# Patient Record
Sex: FEMALE | Race: WHITE
[De-identification: names, ages, dates, MRNs, and addresses within clinical notes are randomized per-mention and may not be internally consistent; named-entity substitution may affect disease eponyms.]

---

## 2017-10-02 ENCOUNTER — HOSPITAL ENCOUNTER (EMERGENCY)
Dept: HOSPITAL 83 - ED | Age: 52
Discharge: HOME | End: 2017-10-02
Payer: COMMERCIAL

## 2017-10-02 VITALS — BODY MASS INDEX: 36.73 KG/M2 | WEIGHT: 234 LBS | HEIGHT: 66.97 IN

## 2017-10-02 DIAGNOSIS — Z79.899: ICD-10-CM

## 2017-10-02 DIAGNOSIS — Z88.0: ICD-10-CM

## 2017-10-02 DIAGNOSIS — Z88.6: ICD-10-CM

## 2017-10-02 DIAGNOSIS — R51: ICD-10-CM

## 2017-10-02 DIAGNOSIS — L03.031: Primary | ICD-10-CM

## 2017-10-02 DIAGNOSIS — Z88.8: ICD-10-CM

## 2017-10-02 DIAGNOSIS — F17.200: ICD-10-CM

## 2017-10-02 DIAGNOSIS — Z91.012: ICD-10-CM

## 2017-10-02 LAB
ALBUMIN SERPL-MCNC: 3.8 GM/DL (ref 3.1–4.5)
ALP SERPL-CCNC: 156 U/L (ref 45–117)
ALT SERPL W P-5'-P-CCNC: 63 U/L (ref 12–78)
AST SERPL-CCNC: 17 IU/L (ref 3–35)
BASOPHILS # BLD AUTO: 0.1 10*3/UL (ref 0–0.1)
BASOPHILS NFR BLD AUTO: 0.6 % (ref 0–1)
BUN SERPL-MCNC: 11 MG/DL (ref 7–24)
CHLORIDE SERPL-SCNC: 104 MMOL/L (ref 98–107)
CREAT SERPL-MCNC: 0.64 MG/DL (ref 0.55–1.02)
EOSINOPHIL # BLD AUTO: 0 % (ref 1–4)
EOSINOPHIL # BLD AUTO: 0 10*3/UL (ref 0–0.4)
ERYTHROCYTE [DISTWIDTH] IN BLOOD BY AUTOMATED COUNT: 14 % (ref 0–14.5)
HCT VFR BLD AUTO: 45.8 % (ref 37–47)
HGB BLD-MCNC: 14.9 G/DL (ref 12–16)
LYMPHOCYTES # BLD AUTO: 4.9 10*3/UL (ref 1.3–4.4)
LYMPHOCYTES NFR BLD AUTO: 32 % (ref 27–41)
MCH RBC QN AUTO: 27.4 PG (ref 27–31)
MCHC RBC AUTO-ENTMCNC: 32.5 G/DL (ref 33–37)
MCV RBC AUTO: 84.2 FL (ref 81–99)
MONOCYTES # BLD AUTO: 1 10*3/UL (ref 0.1–1)
MONOCYTES NFR BLD MANUAL: 6.4 % (ref 3–9)
NEUT #: 9.3 10*3/UL (ref 2.3–7.9)
NEUT %: 60.7 % (ref 47–73)
NRBC BLD QL AUTO: 0 10*3/UL (ref 0–0)
PLATELET # BLD AUTO: 277 10*3/UL (ref 130–400)
PMV BLD AUTO: 11 FL (ref 9.6–12.3)
POTASSIUM SERPL-SCNC: 3.9 MMOL/L (ref 3.5–5.1)
PROT SERPL-MCNC: 8.6 GM/DL (ref 6.4–8.2)
RBC # BLD AUTO: 5.44 10*6/UL (ref 4.1–5.1)
SODIUM SERPL-SCNC: 139 MMOL/L (ref 136–145)
WBC NRBC COR # BLD AUTO: 15.3 10*3/UL (ref 4.8–10.8)

## 2017-10-18 ENCOUNTER — HOSPITAL ENCOUNTER (OUTPATIENT)
Dept: HOSPITAL 83 - CT | Age: 52
Discharge: HOME | End: 2017-10-18
Attending: FAMILY MEDICINE
Payer: COMMERCIAL

## 2017-10-18 DIAGNOSIS — K21.9: ICD-10-CM

## 2017-10-18 DIAGNOSIS — R91.1: Primary | ICD-10-CM

## 2018-03-02 ENCOUNTER — HOSPITAL ENCOUNTER (EMERGENCY)
Dept: HOSPITAL 83 - ED | Age: 53
Discharge: HOME | End: 2018-03-02
Payer: COMMERCIAL

## 2018-03-02 VITALS — WEIGHT: 217 LBS | HEIGHT: 66.97 IN | BODY MASS INDEX: 34.06 KG/M2

## 2018-03-02 DIAGNOSIS — Z91.012: ICD-10-CM

## 2018-03-02 DIAGNOSIS — Z90.710: ICD-10-CM

## 2018-03-02 DIAGNOSIS — Z88.5: ICD-10-CM

## 2018-03-02 DIAGNOSIS — F10.10: ICD-10-CM

## 2018-03-02 DIAGNOSIS — Z90.49: ICD-10-CM

## 2018-03-02 DIAGNOSIS — Z88.8: ICD-10-CM

## 2018-03-02 DIAGNOSIS — Z88.6: ICD-10-CM

## 2018-03-02 DIAGNOSIS — M77.32: Primary | ICD-10-CM

## 2018-03-02 DIAGNOSIS — K21.9: ICD-10-CM

## 2018-03-02 DIAGNOSIS — Z88.0: ICD-10-CM

## 2018-03-02 DIAGNOSIS — J45.909: ICD-10-CM

## 2018-03-02 DIAGNOSIS — I10: ICD-10-CM

## 2018-03-02 DIAGNOSIS — Z79.899: ICD-10-CM

## 2018-06-16 ENCOUNTER — HOSPITAL ENCOUNTER (EMERGENCY)
Dept: HOSPITAL 83 - ED | Age: 53
Discharge: HOME | End: 2018-06-16
Payer: COMMERCIAL

## 2018-06-16 VITALS — HEIGHT: 66.97 IN | WEIGHT: 220 LBS | BODY MASS INDEX: 34.53 KG/M2

## 2018-06-16 DIAGNOSIS — Z90.710: ICD-10-CM

## 2018-06-16 DIAGNOSIS — Z88.0: ICD-10-CM

## 2018-06-16 DIAGNOSIS — Z88.8: ICD-10-CM

## 2018-06-16 DIAGNOSIS — Z91.012: ICD-10-CM

## 2018-06-16 DIAGNOSIS — R11.0: ICD-10-CM

## 2018-06-16 DIAGNOSIS — M54.9: ICD-10-CM

## 2018-06-16 DIAGNOSIS — Z90.49: ICD-10-CM

## 2018-06-16 DIAGNOSIS — J45.909: ICD-10-CM

## 2018-06-16 DIAGNOSIS — F17.200: ICD-10-CM

## 2018-06-16 DIAGNOSIS — K21.9: ICD-10-CM

## 2018-06-16 DIAGNOSIS — K62.89: ICD-10-CM

## 2018-06-16 DIAGNOSIS — I10: ICD-10-CM

## 2018-06-16 DIAGNOSIS — R10.30: Primary | ICD-10-CM

## 2018-06-16 DIAGNOSIS — Z88.5: ICD-10-CM

## 2018-06-16 LAB
ALBUMIN SERPL-MCNC: 3.9 GM/DL (ref 3.1–4.5)
ALP SERPL-CCNC: 149 U/L (ref 45–117)
ALT SERPL W P-5'-P-CCNC: 51 U/L (ref 12–78)
APPEARANCE UR: (no result)
AST SERPL-CCNC: 27 IU/L (ref 3–35)
BACTERIA #/AREA URNS HPF: (no result) /[HPF]
BASOPHILS # BLD AUTO: 0.1 10*3/UL (ref 0–0.1)
BASOPHILS NFR BLD AUTO: 0.7 % (ref 0–1)
BILIRUB UR QL STRIP: NEGATIVE
BUN SERPL-MCNC: 9 MG/DL (ref 7–24)
CHLORIDE SERPL-SCNC: 109 MMOL/L (ref 98–107)
COLOR UR: YELLOW
CREAT SERPL-MCNC: 0.74 MG/DL (ref 0.55–1.02)
EOSINOPHIL # BLD AUTO: 0 % (ref 1–4)
EOSINOPHIL # BLD AUTO: 0 10*3/UL (ref 0–0.4)
EPI CELLS #/AREA URNS HPF: (no result) /[HPF]
ERYTHROCYTE [DISTWIDTH] IN BLOOD BY AUTOMATED COUNT: 13.9 % (ref 0–14.5)
GLUCOSE UR QL: NEGATIVE
HCT VFR BLD AUTO: 47.4 % (ref 37–47)
HGB BLD-MCNC: 15.4 G/DL (ref 12–16)
HGB UR QL STRIP: NEGATIVE
KETONES UR QL STRIP: NEGATIVE
LEUKOCYTE ESTERASE UR QL STRIP: NEGATIVE
LYMPHOCYTES # BLD AUTO: 3.8 10*3/UL (ref 1.3–4.4)
LYMPHOCYTES NFR BLD AUTO: 28.5 % (ref 27–41)
MCH RBC QN AUTO: 27.7 PG (ref 27–31)
MCHC RBC AUTO-ENTMCNC: 32.5 G/DL (ref 33–37)
MCV RBC AUTO: 85.4 FL (ref 81–99)
MONOCYTES # BLD AUTO: 0.8 10*3/UL (ref 0.1–1)
MONOCYTES NFR BLD MANUAL: 6.4 % (ref 3–9)
NEUT #: 8.5 10*3/UL (ref 2.3–7.9)
NEUT %: 63.9 % (ref 47–73)
NITRITE UR QL STRIP: NEGATIVE
NRBC BLD QL AUTO: 0 % (ref 0–0)
PH UR STRIP: 5.5 [PH] (ref 5–9)
PLATELET # BLD AUTO: 266 10*3/UL (ref 130–400)
PMV BLD AUTO: 11 FL (ref 9.6–12.3)
POTASSIUM SERPL-SCNC: 4.3 MMOL/L (ref 3.5–5.1)
PROT SERPL-MCNC: 7.9 GM/DL (ref 6.4–8.2)
RBC # BLD AUTO: 5.55 10*6/UL (ref 4.1–5.1)
RBC #/AREA URNS HPF: (no result) RBC/HPF (ref 0–2)
SODIUM SERPL-SCNC: 142 MMOL/L (ref 136–145)
SP GR UR: 1.01 (ref 1–1.03)
UROBILINOGEN UR STRIP-MCNC: 0.2 E.U./DL (ref 0.2–1)
WBC NRBC COR # BLD AUTO: 13.2 10*3/UL (ref 4.8–10.8)

## 2019-11-07 ENCOUNTER — HOSPITAL ENCOUNTER (OUTPATIENT)
Dept: HOSPITAL 83 - LAB | Age: 54
Discharge: HOME | End: 2019-11-07
Attending: DERMATOLOGY
Payer: COMMERCIAL

## 2019-11-07 DIAGNOSIS — L73.2: Primary | ICD-10-CM

## 2019-11-07 LAB
ALBUMIN SERPL-MCNC: 3.9 GM/DL (ref 3.1–4.5)
ALBUMIN: 3.9 GM/DL (ref 3.1–4.5)
ALP BLD-CCNC: 121 U/L (ref 45–117)
ALP SERPL-CCNC: 121 U/L (ref 45–117)
ALT SERPL W P-5'-P-CCNC: 52 U/L (ref 12–78)
ALT SERPL-CCNC: 52 U/L (ref 12–78)
AST SERPL-CCNC: 23 IU/L (ref 3–35)
AST SERPL-CCNC: 23 IU/L (ref 3–35)
BILIRUB SERPL-MCNC: 0.3 MG/DL (ref 0.2–1)
BUN BLDV-MCNC: 10 MG/DL (ref 7–24)
BUN SERPL-MCNC: 10 MG/DL (ref 7–24)
CALCIUM SERPL-MCNC: 9.4 MG/DL (ref 8.5–10.5)
CHLORIDE BLD-SCNC: 104 MMOL/L (ref 98–107)
CHLORIDE SERPL-SCNC: 104 MMOL/L (ref 98–107)
CO2: 27 MMOL/L (ref 21–32)
CREAT SERPL-MCNC: 0.9 MG/DL (ref 0.55–1.02)
CREAT SERPL-MCNC: 0.9 MG/DL (ref 0.55–1.02)
ERYTHROCYTE [DISTWIDTH] IN BLOOD BY AUTOMATED COUNT: 13.6 % (ref 0–14.5)
GFR AFRICAN AMERICAN: > 60 ML/MIN
GFR SERPL CREATININE-BSD FRML MDRD: >60 ML/MIN/
GLUCOSE: 175 MG/DL (ref 65–99)
HCT VFR BLD AUTO: 45.2 % (ref 37–47)
HCT VFR BLD CALC: 45.2 % (ref 37–47)
HEMOGLOBIN: 14.4 G/DL (ref 12–16)
HGB BLD-MCNC: 14.4 G/DL (ref 12–16)
MCH RBC QN AUTO: 28.4 PG (ref 27–31)
MCH RBC QN AUTO: 28.4 PG (ref 27–31)
MCHC RBC AUTO-ENTMCNC: 31.9 G/DL (ref 33–37)
MCHC RBC AUTO-ENTMCNC: 31.9 G/DL (ref 33–37)
MCV RBC AUTO: 89.2 FL (ref 81–99)
MCV RBC AUTO: 89.2 FL (ref 81–99)
NRBC BLD QL AUTO: 0 % (ref 0–0)
NUCLEATED RED BLOOD CELLS: 0 % (ref 0–0)
PDW BLD-RTO: 13.6 % (ref 0–14.5)
PLATELET # BLD AUTO: 296 10*3/UL (ref 130–400)
PLATELET # BLD: 296 10*3/UL (ref 130–400)
PLATELET SUFFICIENCY: NORMAL
PMV BLD AUTO: 11.7 FL (ref 9.6–12.3)
PMV BLD AUTO: 11.7 FL (ref 9.6–12.3)
POTASSIUM SERPL-SCNC: 3.9 MMOL/L (ref 3.5–5.1)
POTASSIUM SERPL-SCNC: 3.9 MMOL/L (ref 3.5–5.1)
PROT SERPL-MCNC: 8.5 GM/DL (ref 6.4–8.2)
RBC # BLD AUTO: 5.07 10*6/UL (ref 4.1–5.1)
RBC # BLD: 5.07 10*6/UL (ref 4.1–5.1)
RBC MORPH BLD: NORMAL
SODIUM BLD-SCNC: 139 MMOL/L (ref 136–145)
SODIUM SERPL-SCNC: 139 MMOL/L (ref 136–145)
TOTAL CELLS COUNTED: 100 #CELLS
TOTAL PROTEIN: 8.5 GM/DL (ref 6.4–8.2)
VARIANT LYMPHS NFR BLD MANUAL: 5 % (ref 0–0)
WBC # BLD: 12.1 10*3/UL (ref 4.8–10.8)
WBC NRBC COR # BLD AUTO: 12.1 10*3/UL (ref 4.8–10.8)

## 2019-11-10 LAB
HBV SURFACE AB TITR SER: NON REACTIVE {TITER}
HEPATITIS B SURFACE ANTIGEN: NEGATIVE
HEPATITIS C ANTIBODY: <0.1 (ref 0–0.9)
TB1 AG VALUE: 0.03 IU/ML

## 2020-01-10 ENCOUNTER — HOSPITAL ENCOUNTER (EMERGENCY)
Dept: HOSPITAL 83 - ED | Age: 55
Discharge: HOME | End: 2020-01-10
Payer: COMMERCIAL

## 2020-01-10 VITALS — WEIGHT: 213 LBS | HEIGHT: 66.97 IN | BODY MASS INDEX: 33.43 KG/M2

## 2020-01-10 DIAGNOSIS — N13.2: Primary | ICD-10-CM

## 2020-01-10 DIAGNOSIS — Z88.6: ICD-10-CM

## 2020-01-10 DIAGNOSIS — Z87.442: ICD-10-CM

## 2020-01-10 DIAGNOSIS — K21.9: ICD-10-CM

## 2020-01-10 DIAGNOSIS — R91.1: ICD-10-CM

## 2020-01-10 DIAGNOSIS — Z91.010: ICD-10-CM

## 2020-01-10 DIAGNOSIS — J45.909: ICD-10-CM

## 2020-01-10 DIAGNOSIS — Z90.49: ICD-10-CM

## 2020-01-10 DIAGNOSIS — R11.10: ICD-10-CM

## 2020-01-10 DIAGNOSIS — Z88.0: ICD-10-CM

## 2020-01-10 DIAGNOSIS — E11.9: ICD-10-CM

## 2020-01-10 DIAGNOSIS — Z79.899: ICD-10-CM

## 2020-01-10 DIAGNOSIS — I10: ICD-10-CM

## 2020-01-10 DIAGNOSIS — Z88.8: ICD-10-CM

## 2020-01-10 DIAGNOSIS — F17.200: ICD-10-CM

## 2020-01-10 LAB
ALBUMIN SERPL-MCNC: 3.8 GM/DL (ref 3.1–4.5)
ALP SERPL-CCNC: 121 U/L (ref 45–117)
ALT SERPL W P-5'-P-CCNC: 64 U/L (ref 12–78)
APPEARANCE UR: (no result)
AST SERPL-CCNC: 25 IU/L (ref 3–35)
BACTERIA #/AREA URNS HPF: (no result) /[HPF]
BASOPHILS # BLD AUTO: 0.1 10*3/UL (ref 0–0.1)
BASOPHILS NFR BLD AUTO: 0.6 % (ref 0–1)
BILIRUB UR QL STRIP: NEGATIVE
BUN SERPL-MCNC: 14 MG/DL (ref 7–24)
CHLORIDE SERPL-SCNC: 111 MMOL/L (ref 98–107)
COLOR UR: YELLOW
CREAT SERPL-MCNC: 0.83 MG/DL (ref 0.55–1.02)
EOSINOPHIL # BLD AUTO: 0 % (ref 1–4)
EOSINOPHIL # BLD AUTO: 0 10*3/UL (ref 0–0.4)
EPI CELLS #/AREA URNS HPF: (no result) /[HPF]
ERYTHROCYTE [DISTWIDTH] IN BLOOD BY AUTOMATED COUNT: 13.9 % (ref 0–14.5)
GLUCOSE UR QL: NEGATIVE
HCT VFR BLD AUTO: 45.8 % (ref 37–47)
HGB BLD-MCNC: 14.3 G/DL (ref 12–16)
HGB UR QL STRIP: (no result)
KETONES UR QL STRIP: NEGATIVE
LEUKOCYTE ESTERASE UR QL STRIP: NEGATIVE
LYMPHOCYTES # BLD AUTO: 2.9 10*3/UL (ref 1.3–4.4)
LYMPHOCYTES NFR BLD AUTO: 18.2 % (ref 27–41)
MCH RBC QN AUTO: 26.8 PG (ref 27–31)
MCHC RBC AUTO-ENTMCNC: 31.2 G/DL (ref 33–37)
MCV RBC AUTO: 85.8 FL (ref 81–99)
MONOCYTES # BLD AUTO: 1 10*3/UL (ref 0.1–1)
MONOCYTES NFR BLD MANUAL: 6.1 % (ref 3–9)
NEUT #: 12 10*3/UL (ref 2.3–7.9)
NEUT %: 74.4 % (ref 47–73)
NITRITE UR QL STRIP: NEGATIVE
NRBC BLD QL AUTO: 0 % (ref 0–0)
PH UR STRIP: 5 [PH] (ref 5–9)
PLATELET # BLD AUTO: 264 10*3/UL (ref 130–400)
PMV BLD AUTO: 11.3 FL (ref 9.6–12.3)
POTASSIUM SERPL-SCNC: 4 MMOL/L (ref 3.5–5.1)
PROT SERPL-MCNC: 7.9 GM/DL (ref 6.4–8.2)
RBC # BLD AUTO: 5.34 10*6/UL (ref 4.1–5.1)
RBC #/AREA URNS HPF: (no result) RBC/HPF (ref 0–2)
SODIUM SERPL-SCNC: 145 MMOL/L (ref 136–145)
SP GR UR: >= 1.03 (ref 1–1.03)
UROBILINOGEN UR STRIP-MCNC: 0.2 E.U./DL (ref 0.2–1)
WBC #/AREA URNS HPF: (no result) WBC/HPF (ref 0–5)
WBC NRBC COR # BLD AUTO: 16.2 10*3/UL (ref 4.8–10.8)

## 2020-01-28 ENCOUNTER — HOSPITAL ENCOUNTER (OUTPATIENT)
Dept: HOSPITAL 83 - LAB | Age: 55
Discharge: HOME | End: 2020-01-28
Attending: UROLOGY
Payer: COMMERCIAL

## 2020-01-28 DIAGNOSIS — I10: Primary | ICD-10-CM

## 2020-01-28 DIAGNOSIS — N20.0: ICD-10-CM

## 2020-01-28 LAB
ALBUMIN SERPL-MCNC: 3.6 GM/DL (ref 3.1–4.5)
ALP SERPL-CCNC: 124 U/L (ref 45–117)
ALT SERPL W P-5'-P-CCNC: 56 U/L (ref 12–78)
APPEARANCE UR: (no result)
AST SERPL-CCNC: 17 IU/L (ref 3–35)
BACTERIA #/AREA URNS HPF: (no result) /[HPF]
BASOPHILS # BLD AUTO: 0.1 10*3/UL (ref 0–0.1)
BASOPHILS NFR BLD AUTO: 0.8 % (ref 0–1)
BILIRUB UR QL STRIP: NEGATIVE
BUN SERPL-MCNC: 9 MG/DL (ref 7–24)
CHLORIDE SERPL-SCNC: 108 MMOL/L (ref 98–107)
COLOR UR: (no result)
CREAT SERPL-MCNC: 0.8 MG/DL (ref 0.55–1.02)
EOSINOPHIL # BLD AUTO: 0 % (ref 1–4)
EOSINOPHIL # BLD AUTO: 0 10*3/UL (ref 0–0.4)
ERYTHROCYTE [DISTWIDTH] IN BLOOD BY AUTOMATED COUNT: 14.2 % (ref 0–14.5)
GLUCOSE UR QL: NEGATIVE
HCT VFR BLD AUTO: 47.1 % (ref 37–47)
HGB BLD-MCNC: 14.8 G/DL (ref 12–16)
HGB UR QL STRIP: NEGATIVE
KETONES UR QL STRIP: (no result)
LEUKOCYTE ESTERASE UR QL STRIP: NEGATIVE
LYMPHOCYTES # BLD AUTO: 3.7 10*3/UL (ref 1.3–4.4)
LYMPHOCYTES NFR BLD AUTO: 33.9 % (ref 27–41)
MCH RBC QN AUTO: 27.2 PG (ref 27–31)
MCHC RBC AUTO-ENTMCNC: 31.4 G/DL (ref 33–37)
MCV RBC AUTO: 86.4 FL (ref 81–99)
MONOCYTES # BLD AUTO: 1 10*3/UL (ref 0.1–1)
MONOCYTES NFR BLD MANUAL: 8.8 % (ref 3–9)
MUCOUS THREADS URNS QL MICRO: (no result)
NEUT #: 6.2 10*3/UL (ref 2.3–7.9)
NEUT %: 56.1 % (ref 47–73)
NITRITE UR QL STRIP: NEGATIVE
NRBC BLD QL AUTO: 0 % (ref 0–0)
PH UR STRIP: 5 [PH] (ref 5–9)
PLATELET # BLD AUTO: 234 10*3/UL (ref 130–400)
PMV BLD AUTO: 11.9 FL (ref 9.6–12.3)
POTASSIUM SERPL-SCNC: 3.8 MMOL/L (ref 3.5–5.1)
PROT SERPL-MCNC: 7.7 GM/DL (ref 6.4–8.2)
RBC # BLD AUTO: 5.45 10*6/UL (ref 4.1–5.1)
RBC #/AREA URNS HPF: (no result) RBC/HPF (ref 0–2)
SODIUM SERPL-SCNC: 141 MMOL/L (ref 136–145)
SP GR UR: 1.03 (ref 1–1.03)
T3 SERPL-MCNC: 42 % (ref 31–39)
T4 SERPL-MCNC: 8.9 UG/DL (ref 4.8–13.9)
UROBILINOGEN UR STRIP-MCNC: 0.2 E.U./DL (ref 0.2–1)
WBC #/AREA URNS HPF: (no result) WBC/HPF (ref 0–5)
WBC NRBC COR # BLD AUTO: 11 10*3/UL (ref 4.8–10.8)

## 2020-01-30 ENCOUNTER — HOSPITAL ENCOUNTER (INPATIENT)
Dept: HOSPITAL 83 - ED | Age: 55
LOS: 4 days | Discharge: LEFT BEFORE DISCHARGE | DRG: 720 | End: 2020-02-03
Attending: INTERNAL MEDICINE | Admitting: INTERNAL MEDICINE
Payer: COMMERCIAL

## 2020-01-30 VITALS — DIASTOLIC BLOOD PRESSURE: 72 MMHG | SYSTOLIC BLOOD PRESSURE: 130 MMHG

## 2020-01-30 VITALS — WEIGHT: 234.31 LBS | HEIGHT: 66.97 IN | BODY MASS INDEX: 36.78 KG/M2

## 2020-01-30 VITALS — SYSTOLIC BLOOD PRESSURE: 130 MMHG | DIASTOLIC BLOOD PRESSURE: 76 MMHG

## 2020-01-30 VITALS — DIASTOLIC BLOOD PRESSURE: 55 MMHG

## 2020-01-30 VITALS — DIASTOLIC BLOOD PRESSURE: 76 MMHG | SYSTOLIC BLOOD PRESSURE: 129 MMHG

## 2020-01-30 VITALS — DIASTOLIC BLOOD PRESSURE: 58 MMHG

## 2020-01-30 DIAGNOSIS — F48.2: ICD-10-CM

## 2020-01-30 DIAGNOSIS — Z53.29: ICD-10-CM

## 2020-01-30 DIAGNOSIS — M54.9: ICD-10-CM

## 2020-01-30 DIAGNOSIS — F17.210: ICD-10-CM

## 2020-01-30 DIAGNOSIS — Z80.1: ICD-10-CM

## 2020-01-30 DIAGNOSIS — Z80.8: ICD-10-CM

## 2020-01-30 DIAGNOSIS — Z90.49: ICD-10-CM

## 2020-01-30 DIAGNOSIS — J96.21: ICD-10-CM

## 2020-01-30 DIAGNOSIS — J18.9: ICD-10-CM

## 2020-01-30 DIAGNOSIS — R65.20: ICD-10-CM

## 2020-01-30 DIAGNOSIS — J45.901: ICD-10-CM

## 2020-01-30 DIAGNOSIS — Z88.0: ICD-10-CM

## 2020-01-30 DIAGNOSIS — Z79.899: ICD-10-CM

## 2020-01-30 DIAGNOSIS — Z82.49: ICD-10-CM

## 2020-01-30 DIAGNOSIS — K21.9: ICD-10-CM

## 2020-01-30 DIAGNOSIS — J20.9: ICD-10-CM

## 2020-01-30 DIAGNOSIS — Z90.710: ICD-10-CM

## 2020-01-30 DIAGNOSIS — Z91.018: ICD-10-CM

## 2020-01-30 DIAGNOSIS — I10: ICD-10-CM

## 2020-01-30 DIAGNOSIS — Z88.6: ICD-10-CM

## 2020-01-30 DIAGNOSIS — Z88.8: ICD-10-CM

## 2020-01-30 DIAGNOSIS — E11.65: ICD-10-CM

## 2020-01-30 DIAGNOSIS — E44.0: ICD-10-CM

## 2020-01-30 DIAGNOSIS — Z71.6: ICD-10-CM

## 2020-01-30 DIAGNOSIS — A41.9: Primary | ICD-10-CM

## 2020-01-30 LAB
ALBUMIN SERPL-MCNC: 3.5 GM/DL (ref 3.1–4.5)
ALP SERPL-CCNC: 107 U/L (ref 45–117)
ALT SERPL W P-5'-P-CCNC: 62 U/L (ref 12–78)
APTT PPP: 26.2 SECONDS (ref 20–32.1)
AST SERPL-CCNC: 20 IU/L (ref 3–35)
BASOPHILS # BLD AUTO: 0.1 10*3/UL (ref 0–0.1)
BASOPHILS NFR BLD AUTO: 0.7 % (ref 0–1)
BUN SERPL-MCNC: 10 MG/DL (ref 7–24)
CHLORIDE SERPL-SCNC: 102 MMOL/L (ref 98–107)
CREAT SERPL-MCNC: 0.71 MG/DL (ref 0.55–1.02)
EOSINOPHIL # BLD AUTO: 0.2 10*3/UL (ref 0–0.4)
EOSINOPHIL # BLD AUTO: 2 % (ref 1–4)
ERYTHROCYTE [DISTWIDTH] IN BLOOD BY AUTOMATED COUNT: 14.4 % (ref 0–14.5)
HCT VFR BLD AUTO: 47.3 % (ref 37–47)
HGB BLD-MCNC: 15.3 G/DL (ref 12–16)
INR BLD: 1 (ref 2–3.5)
LYMPHOCYTES # BLD AUTO: 2.2 10*3/UL (ref 1.3–4.4)
LYMPHOCYTES NFR BLD AUTO: 18 % (ref 27–41)
MCH RBC QN AUTO: 27.5 PG (ref 27–31)
MCHC RBC AUTO-ENTMCNC: 32.3 G/DL (ref 33–37)
MCV RBC AUTO: 85.1 FL (ref 81–99)
MONOCYTES # BLD AUTO: 1.1 10*3/UL (ref 0.1–1)
MONOCYTES NFR BLD MANUAL: 9 % (ref 3–9)
NEUT #: 8.4 10*3/UL (ref 2.3–7.9)
NEUT %: 69.8 % (ref 47–73)
NRBC BLD QL AUTO: 0 % (ref 0–0)
PLATELET # BLD AUTO: 209 10*3/UL (ref 130–400)
PMV BLD AUTO: 11.4 FL (ref 9.6–12.3)
POTASSIUM SERPL-SCNC: 3.9 MMOL/L (ref 3.5–5.1)
PROT SERPL-MCNC: 8 GM/DL (ref 6.4–8.2)
RBC # BLD AUTO: 5.56 10*6/UL (ref 4.1–5.1)
SODIUM SERPL-SCNC: 137 MMOL/L (ref 136–145)
TROPONIN I SERPL-MCNC: < 0.015 NG/ML (ref ?–0.04)
WBC NRBC COR # BLD AUTO: 12.1 10*3/UL (ref 4.8–10.8)

## 2020-01-30 NOTE — NUR
[facility] under the
services of MARVIN Araujo DO with a diagnosis of SEPSIS.
Chief complaint is SOB.
Patient arrived via bed from ER.
Monitor applied. Initial assessment completed.
Vital signs taken and recorded.
MARVIN ARAUJO DO notified of admission to the unit.
Orders received.
See assessment for past medical history, medications
and allergies.
Patient and/or family oriented to [facility]. [facility]
Clothing/patient valuable form completed.
 
TAE BHARDWAJ

## 2020-01-31 VITALS — DIASTOLIC BLOOD PRESSURE: 63 MMHG | SYSTOLIC BLOOD PRESSURE: 140 MMHG

## 2020-01-31 VITALS — DIASTOLIC BLOOD PRESSURE: 55 MMHG

## 2020-01-31 VITALS — DIASTOLIC BLOOD PRESSURE: 62 MMHG

## 2020-01-31 VITALS — DIASTOLIC BLOOD PRESSURE: 56 MMHG

## 2020-01-31 VITALS — DIASTOLIC BLOOD PRESSURE: 58 MMHG | SYSTOLIC BLOOD PRESSURE: 121 MMHG

## 2020-01-31 LAB
ALBUMIN SERPL-MCNC: 3.2 GM/DL (ref 3.1–4.5)
ALP SERPL-CCNC: 81 U/L (ref 45–117)
ALT SERPL W P-5'-P-CCNC: 45 U/L (ref 12–78)
AST SERPL-CCNC: 18 IU/L (ref 3–35)
BASOPHILS # BLD AUTO: 0 10*3/UL (ref 0–0.1)
BASOPHILS NFR BLD AUTO: 0.3 % (ref 0–1)
BUN SERPL-MCNC: 11 MG/DL (ref 7–24)
CHLORIDE SERPL-SCNC: 109 MMOL/L (ref 98–107)
CREAT SERPL-MCNC: 0.62 MG/DL (ref 0.55–1.02)
EOSINOPHIL # BLD AUTO: 0 % (ref 1–4)
EOSINOPHIL # BLD AUTO: 0 10*3/UL (ref 0–0.4)
ERYTHROCYTE [DISTWIDTH] IN BLOOD BY AUTOMATED COUNT: 14.2 % (ref 0–14.5)
HCT VFR BLD AUTO: 41.8 % (ref 37–47)
HGB BLD-MCNC: 13.2 G/DL (ref 12–16)
LYMPHOCYTES # BLD AUTO: 1.3 10*3/UL (ref 1.3–4.4)
LYMPHOCYTES NFR BLD AUTO: 8.4 % (ref 27–41)
MCH RBC QN AUTO: 26.9 PG (ref 27–31)
MCHC RBC AUTO-ENTMCNC: 31.6 G/DL (ref 33–37)
MCV RBC AUTO: 85.3 FL (ref 81–99)
MONOCYTES # BLD AUTO: 1 10*3/UL (ref 0.1–1)
MONOCYTES NFR BLD MANUAL: 6.1 % (ref 3–9)
NEUT #: 13.1 10*3/UL (ref 2.3–7.9)
NEUT %: 83.4 % (ref 47–73)
NRBC BLD QL AUTO: 0 10*3/UL (ref 0–0)
PLATELET # BLD AUTO: 200 10*3/UL (ref 130–400)
PMV BLD AUTO: 11.5 FL (ref 9.6–12.3)
POTASSIUM SERPL-SCNC: 3.9 MMOL/L (ref 3.5–5.1)
PROT SERPL-MCNC: 7.2 GM/DL (ref 6.4–8.2)
RBC # BLD AUTO: 4.9 10*6/UL (ref 4.1–5.1)
SODIUM SERPL-SCNC: 142 MMOL/L (ref 136–145)
WBC NRBC COR # BLD AUTO: 15.7 10*3/UL (ref 4.8–10.8)

## 2020-01-31 NOTE — NUR
ASSESSMENT COMPLETED AND DOCUMENTED. PT C/O OF PAIN BACK AND SHOULDER PAIN
RATES A 9 ON PAIN SCALE. MEDICATED PER MAR. RESTING IN BED WATCHING
TV. [facility] Formerly named [facility]

## 2020-01-31 NOTE — NUR
in to talk to patient.
Patient states lives at home with family.
There are no steps in the home.
Physician: jasmin taylor
Pharmacy: [facility]
Home health services: none
Patient's level of ADLs: INDEPENDENT
Patient has working utilities: all working
DME: none
Follow-up physician's appointment after d/c: will be made by hospitalist nurse
director upon discharge
Does patient want to access PORTAL?: no
Discharge plan discussed with patient, she lives at home with family is
independent in adls and ambulation, she states she will return home when
medically stable and denies any home needs, case management will follow.
 
JONATAN DENNIS

## 2020-01-31 NOTE — NUR
PT VERY COOPERATIVE. NO SIGNS OF SOB RESTING QUIETELY. NO C/O OF PAIN OR
DISCOMFORT. [facility]

## 2020-01-31 NOTE — NUR
NO C/O OF NAUSEA. STATES " I FEEL MUCH BETTER" NO SIGNS OF DISCOMFORT. NO C/O
PAIN. VISITIN [facility] FAMILY. REPORT GIVEN TO APRIL. [facility]

## 2020-02-01 VITALS — DIASTOLIC BLOOD PRESSURE: 80 MMHG

## 2020-02-01 VITALS — SYSTOLIC BLOOD PRESSURE: 125 MMHG | DIASTOLIC BLOOD PRESSURE: 59 MMHG

## 2020-02-01 VITALS — DIASTOLIC BLOOD PRESSURE: 82 MMHG | SYSTOLIC BLOOD PRESSURE: 145 MMHG

## 2020-02-01 VITALS — DIASTOLIC BLOOD PRESSURE: 59 MMHG

## 2020-02-01 VITALS — DIASTOLIC BLOOD PRESSURE: 74 MMHG | SYSTOLIC BLOOD PRESSURE: 132 MMHG

## 2020-02-02 VITALS — DIASTOLIC BLOOD PRESSURE: 68 MMHG

## 2020-02-02 VITALS — DIASTOLIC BLOOD PRESSURE: 73 MMHG

## 2020-02-02 VITALS — DIASTOLIC BLOOD PRESSURE: 71 MMHG

## 2020-02-02 VITALS — DIASTOLIC BLOOD PRESSURE: 75 MMHG

## 2020-02-02 LAB
ALBUMIN SERPL-MCNC: 3 GM/DL (ref 3.1–4.5)
ALP SERPL-CCNC: 76 U/L (ref 45–117)
ALT SERPL W P-5'-P-CCNC: 45 U/L (ref 12–78)
AST SERPL-CCNC: 22 IU/L (ref 3–35)
BUN SERPL-MCNC: 12 MG/DL (ref 7–24)
CHLORIDE SERPL-SCNC: 107 MMOL/L (ref 98–107)
CREAT SERPL-MCNC: 0.6 MG/DL (ref 0.55–1.02)
ERYTHROCYTE [DISTWIDTH] IN BLOOD BY AUTOMATED COUNT: 14.7 % (ref 0–14.5)
HCT VFR BLD AUTO: 40.7 % (ref 37–47)
HGB BLD-MCNC: 12.4 G/DL (ref 12–16)
MCH RBC QN AUTO: 26.4 PG (ref 27–31)
MCHC RBC AUTO-ENTMCNC: 30.5 G/DL (ref 33–37)
MCV RBC AUTO: 86.8 FL (ref 81–99)
NRBC BLD QL AUTO: 0 10*3/UL (ref 0–0)
PLASMA CELLS # BLD MANUAL: 1 % (ref 0–0)
PLATELET # BLD AUTO: 194 10*3/UL (ref 130–400)
PLATELET SUFFICIENCY: NORMAL
PMV BLD AUTO: 11.6 FL (ref 9.6–12.3)
POTASSIUM SERPL-SCNC: 4.4 MMOL/L (ref 3.5–5.1)
PROT SERPL-MCNC: 6.6 GM/DL (ref 6.4–8.2)
RBC # BLD AUTO: 4.69 10*6/UL (ref 4.1–5.1)
SODIUM SERPL-SCNC: 142 MMOL/L (ref 136–145)
TOTAL CELLS COUNTED: 100 #CELLS
VARIANT LYMPHS NFR BLD MANUAL: 1 % (ref 0–0)
WBC NRBC COR # BLD AUTO: 9.2 10*3/UL (ref 4.8–10.8)

## 2020-02-02 NOTE — NUR
HOME O2 ASSESSMENT:  PRE BP:  144/75, HR 75, RR 18, PULSE OX 83% ON ROOM AIR
AT REST.  PLACED 2 L/M ON PATIENT, SAT >92% AT REST.  AMBULATED PATIENT APPROX
12 FT ON 2 L/M, SAT DECREASED TO 88% WHILE AMBULATING.  INCREASED O2 TO 3
L/M-SAT 89% WHILE AMBULATING, >4 L/M-SAT 92% WHILE AMBULATING.  PATIENT
VISIBLY SHORT OF BREATH.
POST BP:  151/78, HR 80, RR 22, PULSE OX 92% ON 2 L/M AT REST.
RN NOTIFIED.

## 2020-02-02 NOTE — NUR
SPOKE WITH DR. SLAUGHTER AT THIS TIME NOTIFIED HIM PATIENT IS REQUESTING COUGH
SYRUP AND HER COUGH SYRUP IS SCHEDULED, HE STATED TO CHANGE IT TO PRN AND SHE
CAN HAVE A DOSE NOW

## 2020-02-02 NOTE — NUR
PATIENT AWAKE, ALERT, & ORIENTED X3. LUNGS DIMINISHED WITH VERY FAINT WHEEZES,
PT WEARING O2 @ 2L PER NC. ABD SOFT, NONTENDER, BSX4, NO EDEMA. PT STAETS
COUGH HAS DECREASED A LOT. DENIES ANY PAIN AT THIS TIME & C/O SOB UPON
EXERTION. CALL LIGHT IS WITHIN REACH.

## 2020-02-03 VITALS — SYSTOLIC BLOOD PRESSURE: 142 MMHG | DIASTOLIC BLOOD PRESSURE: 66 MMHG

## 2020-02-03 VITALS — DIASTOLIC BLOOD PRESSURE: 90 MMHG | SYSTOLIC BLOOD PRESSURE: 130 MMHG

## 2020-02-03 VITALS — DIASTOLIC BLOOD PRESSURE: 77 MMHG | SYSTOLIC BLOOD PRESSURE: 142 MMHG

## 2020-02-03 NOTE — NUR
case management received a script for a nebulizer for patient, called
[facility] medical, spoke to Yaakov, dave and patient's information faxed
to him, informed him that patient is a possible discharge today to home and
nebulzier can be delivered to her home

## 2020-02-03 NOTE — NUR
AMBULATING PULSE OXIMETRY PERFORMED. NASAL CANNULA REMOVED X25 MINS. ON ROOM
AIR AT REST SPO2 = 86% HR=84 BPM. INCREASED O2 TO 2LNC SPO2 = 92% TO START
WALKING. WHILE WALKING PT REQUIRED INCRIMENTAL INCREASES OF O2 UP TO 4LNC TO
INCREASE SPO2 = 94% FU=285 BPM WHILE AMBULATING. PT RETURNED TO ROOM, AT REST
O2 WAS DECREASED TO 2LNC SPO2 = 95% HR =88 BPM.

## 2020-02-03 NOTE — NUR
Patient may be converted to PO levofloxacin if clinically feasible - all
criteria met for IV to PO conversion.
 
Thanks,
Jair Lee, PharmD, [facility]

## 2020-02-03 NOTE — NUR
PT STATED THAT IF HER OXYGEN IS NOT DELIVERED BY 1500 SHE IS STILL LEAVING, PT
EDUCATED EXTENSIVELY ON NEED FOR OXYGEN, PT SITTING ON BED WITHOUT OXYGEN AND
HER PULSE OX WAS ONLY 84% ON RA, PT STATED " JUNIE PUT IT ON RIGHT NOW BUT IM
TELLING YOU RIGHT NOW IM NOT GOING TO WEAR THIS OXYGEN AT HOME, IM NOT GOING
TO BE 54 AND DEPENDENT ON OXYGEN, PT EDUCATED AGAIN ON NEED FOR OXYGEN AT THIS
TIME, ENCOURAGED PT TO PURCHASE A PULSE OX OVER THR COUNTER TO MONITOR OXYGEN
LEVELS AT HOME. AYE MATHIAS NOTIFIED, IF PATIENT LEAVES PRIOR TO OXYGEN
DELIVERY IT WILL BE AGAINST MEDICAL ADVICE

## 2020-02-03 NOTE — NUR
PT LEFT AMA, REFUSING TO WAIT FOR OXYGEN, PER PT "I JUST WANT YOU TO KNOW IM
LEAVING HERE AND BUYING CIGERETTES BEAUSE OF ALL OF THIS" PT ENCOURAGED TO
WAIT FOR OXYGEN DELIVERY AND TO NOT LEAVE AMA. PT REFUSINGMAKAI MATHIAS
NOTIFIED

## 2020-02-03 NOTE — NUR
case management visits with patient, she states she will be returning home
today or tomorrow. discussed with her [facility] and educated her on the services
they provide, she declines any home needs at this time

## 2021-06-21 LAB
LV EF: 70 %
LVEF MODALITY: NORMAL

## 2021-07-30 RX ORDER — DULOXETIN HYDROCHLORIDE 60 MG/1
1 CAPSULE, DELAYED RELEASE ORAL DAILY
COMMUNITY
Start: 2021-07-23

## 2021-07-30 RX ORDER — LINACLOTIDE 72 UG/1
CAPSULE, GELATIN COATED ORAL
COMMUNITY
Start: 2021-07-08

## 2021-07-30 RX ORDER — METHOCARBAMOL 500 MG/1
TABLET, FILM COATED ORAL
COMMUNITY
Start: 2021-04-29

## 2021-07-30 RX ORDER — FENOFIBRATE 160 MG/1
TABLET ORAL
COMMUNITY
Start: 2021-06-27 | End: 2021-08-05

## 2021-07-30 RX ORDER — LOSARTAN POTASSIUM AND HYDROCHLOROTHIAZIDE 12.5; 5 MG/1; MG/1
TABLET ORAL
COMMUNITY
Start: 2021-06-16 | End: 2021-08-05

## 2021-07-30 RX ORDER — GALCANEZUMAB 120 MG/ML
INJECTION, SOLUTION SUBCUTANEOUS
COMMUNITY
Start: 2021-04-26

## 2021-07-30 RX ORDER — ALBUTEROL SULFATE 90 UG/1
2 AEROSOL, METERED RESPIRATORY (INHALATION)
COMMUNITY
Start: 2021-04-24

## 2021-07-30 RX ORDER — HYDROCODONE BITARTRATE AND ACETAMINOPHEN 5; 325 MG/1; MG/1
TABLET ORAL
COMMUNITY
Start: 2021-07-28

## 2021-07-30 RX ORDER — DULOXETIN HYDROCHLORIDE 30 MG/1
1 CAPSULE, DELAYED RELEASE ORAL DAILY
COMMUNITY
Start: 2021-06-12 | End: 2021-07-30 | Stop reason: DRUGHIGH

## 2021-07-30 RX ORDER — METOPROLOL TARTRATE 100 MG/1
50 TABLET ORAL 2 TIMES DAILY
COMMUNITY
Start: 2021-06-22

## 2021-07-30 RX ORDER — ATORVASTATIN CALCIUM 40 MG/1
1 TABLET, FILM COATED ORAL DAILY
COMMUNITY
Start: 2021-05-20 | End: 2021-08-05

## 2021-08-05 ENCOUNTER — OFFICE VISIT (OUTPATIENT)
Dept: CARDIOLOGY CLINIC | Age: 56
End: 2021-08-05
Payer: MEDICARE

## 2021-08-05 VITALS
WEIGHT: 247 LBS | HEIGHT: 67 IN | HEART RATE: 76 BPM | DIASTOLIC BLOOD PRESSURE: 76 MMHG | RESPIRATION RATE: 16 BRPM | BODY MASS INDEX: 38.77 KG/M2 | SYSTOLIC BLOOD PRESSURE: 136 MMHG

## 2021-08-05 DIAGNOSIS — Z72.0 TOBACCO USE: ICD-10-CM

## 2021-08-05 DIAGNOSIS — E66.9 NON MORBID OBESITY: ICD-10-CM

## 2021-08-05 DIAGNOSIS — Z87.898 HISTORY OF TACHYCARDIA: Primary | ICD-10-CM

## 2021-08-05 DIAGNOSIS — I10 ESSENTIAL HYPERTENSION: ICD-10-CM

## 2021-08-05 PROBLEM — E11.9 DIABETES (HCC): Status: ACTIVE | Noted: 2021-08-05

## 2021-08-05 PROBLEM — R00.0 TACHYCARDIA: Status: ACTIVE | Noted: 2021-08-05

## 2021-08-05 PROCEDURE — G8417 CALC BMI ABV UP PARAM F/U: HCPCS | Performed by: INTERNAL MEDICINE

## 2021-08-05 PROCEDURE — 99213 OFFICE O/P EST LOW 20 MIN: CPT | Performed by: INTERNAL MEDICINE

## 2021-08-05 PROCEDURE — 4004F PT TOBACCO SCREEN RCVD TLK: CPT | Performed by: INTERNAL MEDICINE

## 2021-08-05 PROCEDURE — 3017F COLORECTAL CA SCREEN DOC REV: CPT | Performed by: INTERNAL MEDICINE

## 2021-08-05 PROCEDURE — G8427 DOCREV CUR MEDS BY ELIG CLIN: HCPCS | Performed by: INTERNAL MEDICINE

## 2021-08-05 PROCEDURE — 93000 ELECTROCARDIOGRAM COMPLETE: CPT | Performed by: INTERNAL MEDICINE

## 2021-08-05 RX ORDER — SIMVASTATIN 40 MG
40 TABLET ORAL NIGHTLY
COMMUNITY

## 2021-08-05 RX ORDER — PANTOPRAZOLE SODIUM 40 MG/1
1 TABLET, DELAYED RELEASE ORAL DAILY
COMMUNITY
Start: 2021-05-18

## 2021-08-05 RX ORDER — B-COMPLEX WITH VITAMIN C
1 TABLET ORAL DAILY
COMMUNITY

## 2021-08-05 NOTE — PROGRESS NOTES
OFFICE VISIT     PRIMARY CARE PHYSICIAN:      Jose Green, APRN - CNP       ALLERGIES / SENSITIVITIES:        Allergies   Allergen Reactions    Albumen, Egg Other (See Comments)     NAUSEA    Aspirin Hives    Diphenhydramine Hives    Lisinopril Swelling    Penicillins Hives    Tramadol Hives    Ziprasidone Other (See Comments)    Adhesive Tape Rash     BURNS SKIN          REVIEWED MEDICATIONS:        Current Outpatient Medications:     pantoprazole (PROTONIX) 40 MG tablet, Take 1 tablet by mouth daily, Disp: , Rfl:     metFORMIN (GLUCOPHAGE) 500 MG tablet, Take 1 tablet by mouth daily, Disp: , Rfl:     simvastatin (ZOCOR) 40 MG tablet, Take 40 mg by mouth nightly, Disp: , Rfl:     B Complex Vitamins (VITAMIN B COMPLEX) TABS, Take 1 tablet by mouth daily, Disp: , Rfl:     albuterol sulfate  (90 Base) MCG/ACT inhaler, Inhale 2 puffs into the lungs every 4-6 hours as needed, Disp: , Rfl:     EMGALITY 120 MG/ML SOAJ, , Disp: , Rfl:     HYDROcodone-acetaminophen (NORCO) 5-325 MG per tablet, TAKE 1 TABLET BY MOUTH TWICE DAILY AS NEEDED FOR PAIN, Disp: , Rfl:     metoprolol (LOPRESSOR) 100 MG tablet, Take 50 mg by mouth 2 times daily , Disp: , Rfl:     DULoxetine (CYMBALTA) 60 MG extended release capsule, Take 1 capsule by mouth daily, Disp: , Rfl:     methocarbamol (ROBAXIN) 500 MG tablet, TAKE 1 TABLET BY MOUTH EVERY DAY AS NEEDED FOR MUSCLE SPASMS, Disp: , Rfl:     LINZESS 72 MCG CAPS capsule, TAKE 1 CAPSULE BY MOUTH EVERY DAY ON AN EMPTY STOMACH, Disp: , Rfl:       S: REASON FOR VISIT:       Chief Complaint   Patient presents with    Follow-Up from Hospital     Increased heart rate, referral [facility], no other cardiac complaints          History of Present Illness:       Office Visit for follow up of HTN, Tachycardia   55 yr old Fredrick Correa with Hx of HTN, DM, Obesity came for post-hospital f/u visit   Seen y Dr Viji Bateman in June 2021, had \" heart racing\" while off Metoprolol; BB resumed   Had stress test and Echo -Ok   No hospitalizations or surgeries since last visit   Patient is compliant with all medications   Harry any exertional chest pain or short of breath   No palpitations, dizzy or syncope. Active at home   No orthopnea   Try to watch diet          Past Medical History:   Diagnosis Date    PBA (pseudobulbar affect)           No past surgical history on file. Family History   Problem Relation Age of Onset    Heart Attack Father           Social History     Tobacco Use    Smoking status: Current Every Day Smoker     Packs/day: 0.50     Types: Cigarettes    Smokeless tobacco: Never Used   Substance Use Topics    Alcohol use: Not Currently         Review of Systems:  Constitutional: negative for fever and chills, or significant weight loss  HEENT: negative for acute visual symptoms or auditory problems, no dysphagia  Respiratory: negative for cough, wheezing, or hemoptysis  Cardiovascular: negative for chest pain, palpitations, and dyspnea  Gastrointestinal: negative for abdominal pain, diarrhea, nausea and vomiting  Endocrine: Negative for polyuria and polydyspsia  Genitourinary:negative for dysuria and hematuria  Derm: negative for rash and skin lesion(s)  Neurological: negative for tingling, numbness, weakness, seizures and tremors  Endocrine: negative for polydipsia and polyuria  Musculoskeletal: negative for pain or tenderness  Psychiatric: negative for anxiety, depression, or suicidal ideations         O:  COMPLETE PHYSICAL EXAM:       /76   Pulse 76   Resp 16   Ht 5' 7\" (1.702 m)   Wt 247 lb (112 kg)   BMI 38.69 kg/m²       General:   Patient alert, comfortable, no distress. Appears stated age. HEENT:    Pupils equal, no icterus, no nasal drainage, tongue moist.   Neck:              No masses, Thyroid not palpable. No elevated JVD, No carotid bruit. Chest:   Normal configuration, non tender.    Lungs:   Clear to auscultation bilaterally, few scattered rhonchi. Cardiovascular:  Regular rhythm, 1/6 systolic murmur, No S3, no palpable thrills,    Abdomen:  Soft, Non tender, Bowel sounds normal, Obese,   Extremities:  No edema. Distal pulses palpable. No cyanosis, no clubbing. Skin:   Good turgor, warm and dry, no cyanosis. Musculoskeletal: No joint swelling or deformity. Neuro:   Cranial nerves grossly intact; No focal neurologic deficit. Psych:   Alert, good mood and effect. REVIEW OF DIAGNOSTIC TESTS:        Electrocardiogram: Sinus rhythm, Normal QTc interval     2D Echo: 6/21/21; EF normal     Stress Test: 6/23/21 - No ischemia, EF normal            A/P:   ASSESSMENT / PLAN:    Adela Hill was seen today for follow-up from hospital.    Diagnoses and all orders for this visit:    History of tachycardia - Stable on BB    Essential hypertension - Controlled    Non morbid obesity - Diet, exercise and weight loss discussed. Tobacco use - Counseled to quit smoking    Other orders  -     EKG 12 lead     Preventive Cardiology: Low cholesterol diet, regular exercise as tolerate, and gradual weight loss discussed. Monitor BP and heart rates. Above recommendations discussed with her. All questions answered about cardiac diagnoses and cardiac medications. Continue current medications. Compliance with medications and f/u with all physicians discussed. Risk factor modification based on risk profile discussed. Call if any exertional chest pain, short of breath, dizzy or palpitations   Follow up as needed, she is agreeable.          [id number] [facility]  [address] [facility], [address] [facility]  [phone]

## 2021-08-27 ENCOUNTER — HOSPITAL ENCOUNTER (OUTPATIENT)
Dept: HOSPITAL 83 - LAB | Age: 56
Discharge: HOME | End: 2021-08-27
Attending: FAMILY MEDICINE
Payer: COMMERCIAL

## 2021-08-27 DIAGNOSIS — K58.9: ICD-10-CM

## 2021-08-27 DIAGNOSIS — R51.9: ICD-10-CM

## 2021-08-27 DIAGNOSIS — I10: ICD-10-CM

## 2021-08-27 DIAGNOSIS — E11.9: Primary | ICD-10-CM

## 2021-08-27 DIAGNOSIS — G62.9: ICD-10-CM

## 2021-08-27 DIAGNOSIS — D72.829: ICD-10-CM

## 2021-08-27 DIAGNOSIS — E55.9: ICD-10-CM

## 2021-08-27 DIAGNOSIS — E78.00: ICD-10-CM

## 2021-08-27 DIAGNOSIS — K21.9: ICD-10-CM

## 2021-08-27 DIAGNOSIS — M25.50: ICD-10-CM

## 2021-08-27 LAB
25(OH)D3 SERPL-MCNC: 23.1 NG/ML (ref 30–100)
ALBUMIN SERPL-MCNC: 3.5 GM/DL (ref 3.1–4.5)
ALP SERPL-CCNC: 180 U/L (ref 45–117)
ALT SERPL W P-5'-P-CCNC: 63 U/L (ref 12–78)
AST SERPL-CCNC: 22 IU/L (ref 3–35)
BUN SERPL-MCNC: 13 MG/DL (ref 7–24)
CHLORIDE SERPL-SCNC: 107 MMOL/L (ref 98–107)
CHOLEST SERPL-MCNC: 127 MG/DL (ref ?–200)
CK SERPL-CCNC: 68 U/L (ref 26–192)
CREAT SERPL-MCNC: 0.58 MG/DL (ref 0.55–1.02)
ERYTHROCYTE [DISTWIDTH] IN BLOOD BY AUTOMATED COUNT: 14.1 % (ref 0–14.5)
HCT VFR BLD AUTO: 44.6 % (ref 37–47)
LDLC SERPL DIRECT ASSAY-MCNC: 47 MG/DL (ref 9–159)
MCH RBC QN AUTO: 27.6 PG (ref 27–31)
MCHC RBC AUTO-ENTMCNC: 32.5 G/DL (ref 33–37)
MCV RBC AUTO: 84.8 FL (ref 81–99)
NRBC BLD QL AUTO: 0 % (ref 0–0)
PLATELET # BLD AUTO: 282 10*3/UL (ref 130–400)
PMV BLD AUTO: 10.9 FL (ref 9.6–12.3)
POTASSIUM SERPL-SCNC: 4 MMOL/L (ref 3.5–5.1)
PROT SERPL-MCNC: 7.7 GM/DL (ref 6.4–8.2)
RBC # BLD AUTO: 5.26 10*6/UL (ref 4.1–5.1)
SODIUM SERPL-SCNC: 139 MMOL/L (ref 136–145)
T4 FREE SERPL-MCNC: 1.22 NG/DL (ref 0.76–1.46)
TRIGL SERPL-MCNC: 255 MG/DL (ref ?–150)
TSH SERPL DL<=0.005 MIU/L-ACNC: 1.25 UIU/ML (ref 0.36–4.75)
VITAMIN B12: 766 PG/ML (ref 247–911)
WBC NRBC COR # BLD AUTO: 15.8 10*3/UL (ref 4.8–10.8)

## 2021-08-28 LAB — RHEUMATOID FACT SERPL-ACNC: <10 IU/ML (ref 0–13.9)

## 2022-02-16 ENCOUNTER — HOSPITAL ENCOUNTER (OUTPATIENT)
Age: 57
Discharge: HOME | End: 2022-02-18

## 2022-02-16 PROCEDURE — 88305 TISSUE EXAM BY PATHOLOGIST: CPT

## 2022-07-07 ENCOUNTER — HOSPITAL ENCOUNTER (EMERGENCY)
Dept: HOSPITAL 83 - ED | Age: 57
Discharge: ANOTHER HOSPITAL | End: 2022-07-07
Payer: COMMERCIAL

## 2022-07-07 VITALS — WEIGHT: 235 LBS | HEIGHT: 66.97 IN | BODY MASS INDEX: 36.88 KG/M2

## 2022-07-07 DIAGNOSIS — Z98.890: ICD-10-CM

## 2022-07-07 DIAGNOSIS — Z87.891: ICD-10-CM

## 2022-07-07 DIAGNOSIS — Z79.899: ICD-10-CM

## 2022-07-07 DIAGNOSIS — Z90.710: ICD-10-CM

## 2022-07-07 DIAGNOSIS — Z91.012: ICD-10-CM

## 2022-07-07 DIAGNOSIS — Z88.8: ICD-10-CM

## 2022-07-07 DIAGNOSIS — M54.50: Primary | ICD-10-CM

## 2022-07-07 DIAGNOSIS — Z90.49: ICD-10-CM

## 2022-07-07 DIAGNOSIS — Z88.0: ICD-10-CM
